# Patient Record
Sex: MALE | Race: WHITE | NOT HISPANIC OR LATINO | Employment: OTHER | URBAN - METROPOLITAN AREA
[De-identification: names, ages, dates, MRNs, and addresses within clinical notes are randomized per-mention and may not be internally consistent; named-entity substitution may affect disease eponyms.]

---

## 2018-04-05 ENCOUNTER — ESTABLISHED COMPREHENSIVE EXAM (OUTPATIENT)
Dept: URBAN - METROPOLITAN AREA CLINIC 47 | Facility: CLINIC | Age: 73
End: 2018-04-05

## 2018-04-05 DIAGNOSIS — H40.1130: ICD-10-CM

## 2018-04-05 PROCEDURE — 1036F TOBACCO NON-USER: CPT

## 2018-04-05 PROCEDURE — G8427 DOCREV CUR MEDS BY ELIG CLIN: HCPCS

## 2018-04-05 PROCEDURE — 0517F GLAUCOMA PLAN OF CARE DOCD: CPT

## 2018-04-05 PROCEDURE — 3285F IOP DOWN <15% OF PRE-SVC LVL: CPT

## 2018-04-05 PROCEDURE — 2027F OPTIC NERVE HEAD EVAL DONE: CPT

## 2018-04-05 PROCEDURE — 92012 INTRM OPH EXAM EST PATIENT: CPT

## 2018-04-05 RX ORDER — TIMOLOL MALEATE 2.5 MG/ML: 1 SOLUTION OPHTHALMIC TWICE A DAY

## 2018-04-05 ASSESSMENT — VISUAL ACUITY
OS_CC: 20/60-2
OS_CC: J12
OD_SC: 20/400

## 2023-06-09 NOTE — PATIENT DISCUSSION
BMI Within normal limits, continue current management.
ERM does NOT APPEAR VISUALLY SIGNIFICANT.
Good postoperative appearance.
If ERM PROGRESSES and the patient’s symptoms worsen or visual acuity decreases significantly, patient may require vitrectomy and membrane peeling in the future.
My findings and recommendations TODAY are based on the patient's symptoms, exam, diagnostic testing and records.
No new tear/breaks/detachment seen TODAY.
No retinal tears or retinal detachment seen on clinical exam today. Reviewed the signs and symptoms of retinal tear/retinal detachment and the importance of calling for prompt evaluation should there be increasing floaters, new flashing lights, or decreasing peripheral vision in either eye at any time. Observation recommended.
Recommend OBSERVATION and continued MONITORING for new tear/break/retinal detachment.
Recommended observation.
Retina ATTACHED and doing well today.
WITH RT AND RD S/P LASER.
Dr. Wilkins